# Patient Record
Sex: MALE | Race: WHITE | NOT HISPANIC OR LATINO | Employment: FULL TIME | URBAN - NONMETROPOLITAN AREA
[De-identification: names, ages, dates, MRNs, and addresses within clinical notes are randomized per-mention and may not be internally consistent; named-entity substitution may affect disease eponyms.]

---

## 2022-11-19 ENCOUNTER — OFFICE VISIT (OUTPATIENT)
Dept: FAMILY MEDICINE | Facility: CLINIC | Age: 22
End: 2022-11-19
Payer: COMMERCIAL

## 2022-11-19 VITALS
BODY MASS INDEX: 41.16 KG/M2 | RESPIRATION RATE: 18 BRPM | SYSTOLIC BLOOD PRESSURE: 136 MMHG | TEMPERATURE: 99 F | OXYGEN SATURATION: 98 % | HEIGHT: 71 IN | WEIGHT: 294 LBS | HEART RATE: 98 BPM | DIASTOLIC BLOOD PRESSURE: 94 MMHG

## 2022-11-19 DIAGNOSIS — J02.9 SORE THROAT: ICD-10-CM

## 2022-11-19 DIAGNOSIS — J01.00 ACUTE NON-RECURRENT MAXILLARY SINUSITIS: Primary | ICD-10-CM

## 2022-11-19 DIAGNOSIS — R05.8 COUGH WITH EXPOSURE TO COVID-19 VIRUS: ICD-10-CM

## 2022-11-19 DIAGNOSIS — I10 PRIMARY HYPERTENSION: Chronic | ICD-10-CM

## 2022-11-19 DIAGNOSIS — Z20.822 COUGH WITH EXPOSURE TO COVID-19 VIRUS: ICD-10-CM

## 2022-11-19 LAB
CTP QC/QA: YES
CTP QC/QA: YES
FLUAV AG NPH QL: NEGATIVE
FLUBV AG NPH QL: NEGATIVE
S PYO RRNA THROAT QL PROBE: NEGATIVE
SARS-COV-2 AG RESP QL IA.RAPID: NEGATIVE

## 2022-11-19 PROCEDURE — 87880 STREP A ASSAY W/OPTIC: CPT | Mod: QW,,, | Performed by: NURSE PRACTITIONER

## 2022-11-19 PROCEDURE — 3075F SYST BP GE 130 - 139MM HG: CPT | Mod: ,,, | Performed by: NURSE PRACTITIONER

## 2022-11-19 PROCEDURE — 96372 THER/PROPH/DIAG INJ SC/IM: CPT | Mod: ,,, | Performed by: NURSE PRACTITIONER

## 2022-11-19 PROCEDURE — 99051 PR MEDICAL SERVICES, EVE/WKEND/HOLIDAY: ICD-10-PCS | Mod: ,,, | Performed by: NURSE PRACTITIONER

## 2022-11-19 PROCEDURE — 3080F DIAST BP >= 90 MM HG: CPT | Mod: ,,, | Performed by: NURSE PRACTITIONER

## 2022-11-19 PROCEDURE — 1159F PR MEDICATION LIST DOCUMENTED IN MEDICAL RECORD: ICD-10-PCS | Mod: ,,, | Performed by: NURSE PRACTITIONER

## 2022-11-19 PROCEDURE — 4010F PR ACE/ARB THEARPY RXD/TAKEN: ICD-10-PCS | Mod: ,,, | Performed by: NURSE PRACTITIONER

## 2022-11-19 PROCEDURE — 4010F ACE/ARB THERAPY RXD/TAKEN: CPT | Mod: ,,, | Performed by: NURSE PRACTITIONER

## 2022-11-19 PROCEDURE — 87428 POCT SARS-COV2 (COVID) WITH FLU ANTIGEN: ICD-10-PCS | Mod: QW,,, | Performed by: NURSE PRACTITIONER

## 2022-11-19 PROCEDURE — 99214 PR OFFICE/OUTPT VISIT, EST, LEVL IV, 30-39 MIN: ICD-10-PCS | Mod: 25,,, | Performed by: NURSE PRACTITIONER

## 2022-11-19 PROCEDURE — 87428 SARSCOV & INF VIR A&B AG IA: CPT | Mod: QW,,, | Performed by: NURSE PRACTITIONER

## 2022-11-19 PROCEDURE — 99051 MED SERV EVE/WKEND/HOLIDAY: CPT | Mod: ,,, | Performed by: NURSE PRACTITIONER

## 2022-11-19 PROCEDURE — 3008F PR BODY MASS INDEX (BMI) DOCUMENTED: ICD-10-PCS | Mod: ,,, | Performed by: NURSE PRACTITIONER

## 2022-11-19 PROCEDURE — 1160F RVW MEDS BY RX/DR IN RCRD: CPT | Mod: ,,, | Performed by: NURSE PRACTITIONER

## 2022-11-19 PROCEDURE — 3075F PR MOST RECENT SYSTOLIC BLOOD PRESS GE 130-139MM HG: ICD-10-PCS | Mod: ,,, | Performed by: NURSE PRACTITIONER

## 2022-11-19 PROCEDURE — 3008F BODY MASS INDEX DOCD: CPT | Mod: ,,, | Performed by: NURSE PRACTITIONER

## 2022-11-19 PROCEDURE — 1160F PR REVIEW ALL MEDS BY PRESCRIBER/CLIN PHARMACIST DOCUMENTED: ICD-10-PCS | Mod: ,,, | Performed by: NURSE PRACTITIONER

## 2022-11-19 PROCEDURE — 96372 PR INJECTION,THERAP/PROPH/DIAG2ST, IM OR SUBCUT: ICD-10-PCS | Mod: ,,, | Performed by: NURSE PRACTITIONER

## 2022-11-19 PROCEDURE — 99214 OFFICE O/P EST MOD 30 MIN: CPT | Mod: 25,,, | Performed by: NURSE PRACTITIONER

## 2022-11-19 PROCEDURE — 3080F PR MOST RECENT DIASTOLIC BLOOD PRESSURE >= 90 MM HG: ICD-10-PCS | Mod: ,,, | Performed by: NURSE PRACTITIONER

## 2022-11-19 PROCEDURE — 1159F MED LIST DOCD IN RCRD: CPT | Mod: ,,, | Performed by: NURSE PRACTITIONER

## 2022-11-19 PROCEDURE — 87880 POCT RAPID STREP A: ICD-10-PCS | Mod: QW,,, | Performed by: NURSE PRACTITIONER

## 2022-11-19 RX ORDER — CEFTRIAXONE 1 G/1
1 INJECTION, POWDER, FOR SOLUTION INTRAMUSCULAR; INTRAVENOUS
Status: COMPLETED | OUTPATIENT
Start: 2022-11-19 | End: 2022-11-19

## 2022-11-19 RX ORDER — FLUTICASONE PROPIONATE 50 MCG
2 SPRAY, SUSPENSION (ML) NASAL DAILY
Qty: 16 G | Refills: 0 | Status: SHIPPED | OUTPATIENT
Start: 2022-11-19

## 2022-11-19 RX ORDER — CEFUROXIME AXETIL 500 MG/1
500 TABLET ORAL EVERY 12 HOURS
Qty: 14 TABLET | Refills: 0 | Status: SHIPPED | OUTPATIENT
Start: 2022-11-19

## 2022-11-19 RX ORDER — LISINOPRIL 20 MG/1
20 TABLET ORAL DAILY
COMMUNITY
Start: 2022-06-06

## 2022-11-19 RX ORDER — FLUOXETINE HYDROCHLORIDE 40 MG/1
40 CAPSULE ORAL EVERY MORNING
COMMUNITY
Start: 2022-08-02

## 2022-11-19 RX ORDER — BETAMETHASONE SODIUM PHOSPHATE AND BETAMETHASONE ACETATE 3; 3 MG/ML; MG/ML
6 INJECTION, SUSPENSION INTRA-ARTICULAR; INTRALESIONAL; INTRAMUSCULAR; SOFT TISSUE
Status: COMPLETED | OUTPATIENT
Start: 2022-11-19 | End: 2022-11-19

## 2022-11-19 RX ADMIN — BETAMETHASONE SODIUM PHOSPHATE AND BETAMETHASONE ACETATE 6 MG: 3; 3 INJECTION, SUSPENSION INTRA-ARTICULAR; INTRALESIONAL; INTRAMUSCULAR; SOFT TISSUE at 10:11

## 2022-11-19 RX ADMIN — CEFTRIAXONE 1 G: 1 INJECTION, POWDER, FOR SOLUTION INTRAMUSCULAR; INTRAVENOUS at 10:11

## 2022-11-19 NOTE — PROGRESS NOTES
"   Rebecca Little DNP, FNP    [address] [place] [address]     PATIENT NAME: Lester Bradley  : 2000  DATE: 22  MRN: [id number]      Billing Provider: Rebecca Little DNP, FNP  Level of Service:   Patient PCP Information       Provider PCP Type    Primary Doctor No General            Reason for Visit / Chief Complaint: Sinusitis (New patient to this clinic. Complains of one week of sinus drainage, sinus congestion, intermittent productive cough, headache, red sore throat and red itchy left eye. In [employer]. ), Sore Throat, Cough, Conjunctivitis (Left red eye with drainage. ), and Hypertension (On Lisinopril since Feb of this year. Stated he takes it every day. )       Update PCP  Update Chief Complaint         History of Present Illness / Problem Focused Workflow     Lester Bradley presents to the clinic with Sinusitis (New patient to this clinic. Complains of one week of sinus drainage, sinus congestion, intermittent productive cough, headache, red sore throat and red itchy left eye. In [employer]. ), Sore Throat, Cough, Conjunctivitis (Left red eye with drainage. ), and Hypertension (On Lisinopril since Feb of this year. Stated he takes it every day. )     Pt c/o "lime green" nasal drainage and eye discharge.     Has been taking otc meds without any relief.     Sinusitis  Associated symptoms include congestion, coughing and a sore throat. Pertinent negatives include no ear pain, shortness of breath or sinus pressure.   Sore Throat   Associated symptoms include congestion and coughing. Pertinent negatives include no abdominal pain, ear pain or shortness of breath.   Cough  Associated symptoms include a sore throat. Pertinent negatives include no chest pain, ear pain, rash or shortness of breath.   Conjunctivitis  Associated symptoms include congestion, coughing and a sore throat. Pertinent negatives include no abdominal pain, " arthralgias, chest pain, numbness, rash or weakness.   Hypertension  Pertinent negatives include no chest pain, palpitations or shortness of breath.     Review of Systems     Review of Systems   Constitutional:  Negative for activity change and appetite change.   HENT:  Positive for nasal congestion and sore throat. Negative for dental problem, ear pain and sinus pressure/congestion.    Eyes:  Positive for discharge.   Respiratory:  Positive for cough. Negative for chest tightness and shortness of breath.    Cardiovascular:  Negative for chest pain and palpitations.   Gastrointestinal:  Negative for abdominal pain.   Genitourinary:  Negative for bladder incontinence and dysuria.   Musculoskeletal:  Negative for arthralgias.   Integumentary:  Negative for rash.   Neurological:  Negative for dizziness, weakness and numbness.      Medical / Social / Family History     Past Medical History:   Diagnosis Date    Allergy     Anxiety     Depression     Hypertension        History reviewed. No pertinent surgical history.    Social History  Mr. Lester Bradley  reports that he has never smoked. He has never been exposed to tobacco smoke. He has never used smokeless tobacco. He reports that he does not use drugs.    Family History  Mr. Lester Bradley's family history is not on file.    Medications and Allergies     Medications  Outpatient Medications Marked as Taking for the 11/19/22 encounter (Office Visit) with Rebecca Little DNP, FNP   Medication Sig Dispense Refill    FLUoxetine 40 MG capsule Take 40 mg by mouth every morning.      lisinopriL (PRINIVIL,ZESTRIL) 20 MG tablet Take 20 mg by mouth once daily.       Current Facility-Administered Medications for the 11/19/22 encounter (Office Visit) with Rebecca Little DNP, FNP   Medication Dose Route Frequency Provider Last Rate Last Admin    [COMPLETED] betamethasone acetate-betamethasone sodium phosphate injection 6 mg  6 mg Intramuscular 1 time in Clinic/HOD  "Rebecca DEAN AQUILES Little, FNP   6 mg at 11/19/22 1010    [COMPLETED] cefTRIAXone injection 1 g  1 g Intramuscular 1 time in Clinic/HOD Rebecca Little AQUILES, FNP   1 g at 11/19/22 1011       Allergies  Review of patient's allergies indicates:   Allergen Reactions    Azithromycin     Penicillins Hives     As a child but just recently too augmentin & did not have a problem.      Povidone-iodine     Soap        Physical Examination     Vitals:    11/19/22 0909 11/19/22 0914 11/19/22 1001   BP: (!) 139/96 (!) 132/92 (!) 136/94   BP Location: Right arm Right arm Left arm   Patient Position: Sitting Sitting Sitting   BP Method: Large (Automatic) Large (Automatic) Large (Manual)   Pulse: 98     Resp: 18     Temp: 98.5 °F (36.9 °C)     TempSrc: Oral     SpO2: 98%     Weight: 133.4 kg (294 lb)     Height: 5' 11" (1.803 m)       Physical Exam  Vitals and nursing note reviewed.   Constitutional:       General: He is not in acute distress.     Appearance: He is obese.   HENT:      Nose: Congestion and rhinorrhea present.      Mouth/Throat:      Mouth: Mucous membranes are moist.   Eyes:      Pupils: Pupils are equal, round, and reactive to light.   Cardiovascular:      Rate and Rhythm: Normal rate and regular rhythm.      Pulses: Normal pulses.      Heart sounds: No murmur heard.  Pulmonary:      Effort: Pulmonary effort is normal. No respiratory distress.      Breath sounds: Normal breath sounds. No wheezing, rhonchi or rales.   Chest:      Chest wall: No tenderness.   Abdominal:      General: Bowel sounds are normal. There is no distension.      Palpations: Abdomen is soft.      Tenderness: There is no abdominal tenderness. There is no right CVA tenderness, left CVA tenderness, guarding or rebound.   Musculoskeletal:         General: No swelling or tenderness. Normal range of motion.      Cervical back: Normal range of motion and neck supple.      Right lower leg: No edema.      Left lower leg: No edema.   Skin:     General: Skin is " warm and dry.   Neurological:      General: No focal deficit present.      Mental Status: He is alert and oriented to person, place, and time.   Psychiatric:         Mood and Affect: Mood normal.         Behavior: Behavior normal.         Thought Content: Thought content normal.         Judgment: Judgment normal.        Assessment and Plan (including Health Maintenance)      Problem List  Smart Sets  Document Outside HM   :    Plan:         Health Maintenance Due   Topic Date Due    COVID-19 Vaccine (1) Never done       Problem List Items Addressed This Visit          Cardiac/Vascular    Hypertension (Chronic)     Other Visit Diagnoses       Acute non-recurrent maxillary sinusitis    -  Primary    Relevant Medications    cefTRIAXone injection 1 g (Completed)    betamethasone acetate-betamethasone sodium phosphate injection 6 mg (Completed)    cefUROXime (CEFTIN) 500 MG tablet    fluticasone propionate (FLONASE) 50 mcg/actuation nasal spray    chlorpheniramine-phenylephrine 4-10 mg per tablet    Cough with exposure to COVID-19 virus        Relevant Orders    POCT SARS-COV2 (COVID) with Flu Antigen (Completed)    Sore throat        Relevant Orders    POCT rapid strep A (Completed)          Acute non-recurrent maxillary sinusitis  -     cefTRIAXone injection 1 g  -     betamethasone acetate-betamethasone sodium phosphate injection 6 mg  -     cefUROXime (CEFTIN) 500 MG tablet; Take 1 tablet (500 mg total) by mouth every 12 (twelve) hours.  Dispense: 14 tablet; Refill: 0  -     fluticasone propionate (FLONASE) 50 mcg/actuation nasal spray; 2 sprays (100 mcg total) by Each Nostril route once daily.  Dispense: 16 g; Refill: 0  -     chlorpheniramine-phenylephrine 4-10 mg per tablet; Take 1 tablet by mouth 3 (three) times daily as needed for Congestion.  Dispense: 30 tablet; Refill: 0    Cough with exposure to COVID-19 virus  -     POCT SARS-COV2 (COVID) with Flu Antigen    Sore throat  -     POCT rapid strep A    Primary  hypertension     The patient has no Health Maintenance topics of status Not Due        No future appointments.     Follow up if symptoms worsen or fail to improve.     Signature:  Rebecca Little DNP, FNP  [address] [place] [address]  Phone #: [phone]  Fax #: [phone]    Date of encounter: 11/19/22    Patient Instructions   Avoid otc sinus meds. Monitor blood pressure. Follow up with primary care provider if bp remains > 140/90. Increase fluids. Take meds as prescribed. Follow up if symptoms persist or worsen.

## 2022-11-19 NOTE — PATIENT INSTRUCTIONS
Avoid otc sinus meds. Monitor blood pressure. Follow up with primary care provider if bp remains > 140/90. Increase fluids. Take meds as prescribed. Follow up if symptoms persist or worsen.

## 2022-11-19 NOTE — LETTER
November 19, 2022      [facility]  [address] [place] [address]  Phone: [phone]  Fax: [phone]       Patient: Lester Bradley   YOB: 2000  Date of Visit: 11/19/2022    To Whom It May Concern:    Dania Bradley  was at  on 11/19/2022. The patient may return to work/school on 11/21/22 with no restrictions. If you have any questions or concerns, or if I can be of further assistance, please do not hesitate to contact me.    Sincerely,    Rebecca Little, AQUILES, FNP

## 2024-01-05 ENCOUNTER — HOSPITAL ENCOUNTER (OUTPATIENT)
Dept: RADIOLOGY | Facility: HOSPITAL | Age: 24
Discharge: HOME | End: 2024-01-05
Attending: INTERNAL MEDICINE
Payer: COMMERCIAL

## 2024-01-05 DIAGNOSIS — R94.5 NONSPECIFIC ABNORMAL RESULTS OF LIVER FUNCTION STUDY: ICD-10-CM

## 2024-01-05 PROCEDURE — 76705 ECHO EXAM OF ABDOMEN: CPT | Mod: TC

## 2024-08-16 ENCOUNTER — OFFICE VISIT (OUTPATIENT)
Dept: FAMILY MEDICINE | Facility: CLINIC | Age: 24
End: 2024-08-16
Payer: COMMERCIAL

## 2024-08-16 VITALS
HEART RATE: 101 BPM | OXYGEN SATURATION: 97 % | DIASTOLIC BLOOD PRESSURE: 85 MMHG | RESPIRATION RATE: 20 BRPM | BODY MASS INDEX: 41.19 KG/M2 | TEMPERATURE: 98 F | WEIGHT: 294.19 LBS | HEIGHT: 71 IN | SYSTOLIC BLOOD PRESSURE: 130 MMHG

## 2024-08-16 DIAGNOSIS — I10 PRIMARY HYPERTENSION: Primary | ICD-10-CM

## 2024-08-16 DIAGNOSIS — K76.9 LESION OF LIVER: ICD-10-CM

## 2024-08-16 DIAGNOSIS — G43.E11 INTRACTABLE CHRONIC MIGRAINE WITH AURA WITH STATUS MIGRAINOSUS: ICD-10-CM

## 2024-08-16 DIAGNOSIS — E78.2 ELEVATED CHOLESTEROL WITH ELEVATED TRIGLYCERIDES: ICD-10-CM

## 2024-08-16 LAB
ALBUMIN SERPL BCP-MCNC: 4.8 G/DL (ref 3.5–5)
ALBUMIN/GLOB SERPL: 1.7 {RATIO}
ALP SERPL-CCNC: 54 U/L (ref 45–115)
ALT SERPL W P-5'-P-CCNC: 199 U/L (ref 16–61)
ANION GAP SERPL CALCULATED.3IONS-SCNC: 9 MMOL/L (ref 7–16)
AST SERPL W P-5'-P-CCNC: 73 U/L (ref 15–37)
BASOPHILS # BLD AUTO: 0.04 K/UL (ref 0–0.2)
BASOPHILS NFR BLD AUTO: 0.7 % (ref 0–1)
BILIRUB SERPL-MCNC: 0.9 MG/DL (ref ?–1.2)
BUN SERPL-MCNC: 15 MG/DL (ref 7–18)
BUN/CREAT SERPL: 16 (ref 6–20)
CALCIUM SERPL-MCNC: 10 MG/DL (ref 8.5–10.1)
CHLORIDE SERPL-SCNC: 102 MMOL/L (ref 98–107)
CHOLEST SERPL-MCNC: 211 MG/DL (ref 0–200)
CHOLEST/HDLC SERPL: 5.9 {RATIO}
CO2 SERPL-SCNC: 30 MMOL/L (ref 21–32)
CREAT SERPL-MCNC: 0.92 MG/DL (ref 0.7–1.3)
DIFFERENTIAL METHOD BLD: ABNORMAL
EGFR (NO RACE VARIABLE) (RUSH/TITUS): 121 ML/MIN/1.73M2
EOSINOPHIL # BLD AUTO: 0.1 K/UL (ref 0–0.5)
EOSINOPHIL NFR BLD AUTO: 1.8 % (ref 1–4)
ERYTHROCYTE [DISTWIDTH] IN BLOOD BY AUTOMATED COUNT: 12.6 % (ref 11.5–14.5)
GLOBULIN SER-MCNC: 2.9 G/DL (ref 2–4)
GLUCOSE SERPL-MCNC: 105 MG/DL (ref 74–106)
HCT VFR BLD AUTO: 50 % (ref 40–54)
HDLC SERPL-MCNC: 36 MG/DL (ref 40–60)
HGB BLD-MCNC: 16.7 G/DL (ref 13.5–18)
IMM GRANULOCYTES # BLD AUTO: 0.02 K/UL (ref 0–0.04)
IMM GRANULOCYTES NFR BLD: 0.4 % (ref 0–0.4)
LDLC SERPL CALC-MCNC: 117 MG/DL
LDLC/HDLC SERPL: 3.3 {RATIO}
LYMPHOCYTES # BLD AUTO: 2.59 K/UL (ref 1–4.8)
LYMPHOCYTES NFR BLD AUTO: 46.1 % (ref 27–41)
MCH RBC QN AUTO: 28 PG (ref 27–31)
MCHC RBC AUTO-ENTMCNC: 33.4 G/DL (ref 32–36)
MCV RBC AUTO: 83.9 FL (ref 80–96)
MONOCYTES # BLD AUTO: 0.6 K/UL (ref 0–0.8)
MONOCYTES NFR BLD AUTO: 10.7 % (ref 2–6)
MPC BLD CALC-MCNC: 9.5 FL (ref 9.4–12.4)
NEUTROPHILS # BLD AUTO: 2.27 K/UL (ref 1.8–7.7)
NEUTROPHILS NFR BLD AUTO: 40.3 % (ref 53–65)
NONHDLC SERPL-MCNC: 175 MG/DL
NRBC # BLD AUTO: 0 X10E3/UL
NRBC, AUTO (.00): 0 %
PLATELET # BLD AUTO: 285 K/UL (ref 150–400)
POTASSIUM SERPL-SCNC: 4.3 MMOL/L (ref 3.5–5.1)
PROT SERPL-MCNC: 7.7 G/DL (ref 6.4–8.2)
RBC # BLD AUTO: 5.96 M/UL (ref 4.6–6.2)
SODIUM SERPL-SCNC: 137 MMOL/L (ref 136–145)
TRIGL SERPL-MCNC: 289 MG/DL (ref 35–150)
VLDLC SERPL-MCNC: 58 MG/DL
WBC # BLD AUTO: 5.62 K/UL (ref 4.5–11)

## 2024-08-16 PROCEDURE — 80061 LIPID PANEL: CPT | Mod: ,,, | Performed by: CLINICAL MEDICAL LABORATORY

## 2024-08-16 PROCEDURE — 80053 COMPREHEN METABOLIC PANEL: CPT | Mod: ,,, | Performed by: CLINICAL MEDICAL LABORATORY

## 2024-08-16 PROCEDURE — 85025 COMPLETE CBC W/AUTO DIFF WBC: CPT | Mod: ,,, | Performed by: CLINICAL MEDICAL LABORATORY

## 2024-08-16 RX ORDER — DEXTROAMPHETAMINE SULFATE, DEXTROAMPHETAMINE SACCHARATE, AMPHETAMINE SULFATE AND AMPHETAMINE ASPARTATE 5; 5; 5; 5 MG/1; MG/1; MG/1; MG/1
CAPSULE, EXTENDED RELEASE ORAL
Status: CANCELLED | OUTPATIENT
Start: 2024-08-16

## 2024-08-16 RX ORDER — DEXTROAMPHETAMINE SULFATE, DEXTROAMPHETAMINE SACCHARATE, AMPHETAMINE SULFATE AND AMPHETAMINE ASPARTATE 5; 5; 5; 5 MG/1; MG/1; MG/1; MG/1
CAPSULE, EXTENDED RELEASE ORAL
COMMUNITY

## 2024-08-16 RX ORDER — ATOGEPANT 60 MG/1
60 TABLET ORAL DAILY
Qty: 90 TABLET | Refills: 3 | Status: SHIPPED | OUTPATIENT
Start: 2024-08-16

## 2024-08-16 RX ORDER — UBROGEPANT 100 MG/1
100 TABLET ORAL
Qty: 30 TABLET | Refills: 1 | Status: SHIPPED | OUTPATIENT
Start: 2024-08-16

## 2024-08-16 RX ORDER — LISINOPRIL AND HYDROCHLOROTHIAZIDE 12.5; 2 MG/1; MG/1
1 TABLET ORAL
COMMUNITY
Start: 2024-07-31

## 2024-08-16 RX ORDER — SERTRALINE HYDROCHLORIDE 50 MG/1
50 TABLET, FILM COATED ORAL
Status: CANCELLED | OUTPATIENT
Start: 2024-08-16

## 2024-08-16 RX ORDER — SERTRALINE HYDROCHLORIDE 50 MG/1
50 TABLET, FILM COATED ORAL
COMMUNITY
Start: 2024-07-29

## 2024-08-16 RX ORDER — ROSUVASTATIN CALCIUM 5 MG/1
5 TABLET, COATED ORAL
COMMUNITY
Start: 2024-07-31

## 2024-08-16 NOTE — PROGRESS NOTES
SHIVAM Cordero   [facility]  [address]  [place] [address]     PATIENT NAME: Lester Bradley  : 2000  DATE: 24  MRN: [id number]      Billing Provider: SHIVAM Cordero  Level of Service:   Patient PCP Information       Provider PCP Type    Primary Doctor No General            Reason for Visit / Chief Complaint: Establish Care (Med refills was seeing Bishop Mcknight and he was prescribed cholesterol medications and hctz and his wife wants him to have labs and all done to make sure he needs to be on all that medication )   Health Maintenance Due   Topic Date Due    Hepatitis C Screening  Never done    Lipid Panel  Never done    HPV Vaccines (1 - Male 3-dose series) Never done    TETANUS VACCINE  Never done    COVID-19 Vaccine ( season) Never done          Update PCP  Update Chief Complaint         History of Present Illness / Problem Focused Workflow     Lester Bradley presents to the clinic to establish care. He states he has been put on bp and cholesterol med. He states he has been on bp med before, but not cholesterol. He states he just wants everything rechecked because he was not fasting and wants to make sure he needs to take both meds. He states he has had migraines since he was young. He states he has been taking excedrin that helped in the past but recently has been worse. He states he has also taken rx meds for migraines as needed but unsure of name. He states he will have them sometimes 2-3 times per week. He he states he has had numbness to the right side of this face with this. He does have aura with small headache and can just tell he Is about to have one. He states they do generally make it to where he can't function as he will have to go lie down in dark, quiet room. He states dad had migraines growing up. He states his mom also had real bad headaches when she was younger but never went to the dr. He states he does have enlarged lymph node to  the right side of head but has been checked by [facility] at [facility] and everything checked out good. He states he does have elevated liver enzymes and had liver us done that did show a lesion on the right lobes of his liver. It was recommended to have ct scan or mri with and without contrast, but he states he has not had this done. He denies any symptoms from this. He does agree with order to be placed today for these. Provider has called [facility] and pt has had sumatriptans in the past filled that have not helped his migraines. He states he has a friend that has gave him ubrelvy that has helped so he is requesting this today if possible. 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 Pt is followed by GRISEL Brandon NP at [facility] where he is prescribed Adderall and Zoloft. He denies any other needs at this time. NAD noted.       Wt Readings from Last 3 Encounters:   08/16/24 0932 133.4 kg (294 lb 3.2 oz)   11/19/22 0909 133.4 kg (294 lb)        BP Readings from Last 3 Encounters:   08/16/24 130/85   11/19/22 (!) 136/94        Review of Systems     Review of Systems   Constitutional: Negative.    HENT: Negative.     Eyes: Negative.    Respiratory: Negative.     Cardiovascular: Negative.    Gastrointestinal: Negative.    Endocrine: Negative.    Genitourinary: Negative.    Musculoskeletal: Negative.    Integumentary:         Small, enlarged lymph node to back, right, lower portion of head   Allergic/Immunologic: Negative.    Neurological: Negative.    Hematological: Negative.    Psychiatric/Behavioral: Negative.          Medical / Social / Family History     Past Medical History:   Diagnosis Date    Allergy     Anxiety     Depression     Hypertension        Past Surgical History:   Procedure Laterality Date    TONSILLECTOMY         Social History    reports that he has never smoked. He has never  "been exposed to tobacco smoke. He has never used smokeless tobacco. He reports current alcohol use. He reports that he does not use drugs.    Family History  's family history includes Arthritis in his mother; Cancer in his father, paternal grandmother, and paternal uncle; Diabetes in his mother; Hypertension in his father and mother.    Medications and Allergies     Medications  Outpatient Medications Marked as Taking for the 8/16/24 encounter (Office Visit) with Sade Rojo FNP   Medication Sig Dispense Refill    ADDERALL XR 20 mg 24 hr capsule       lisinopriL-hydrochlorothiazide (PRINZIDE,ZESTORETIC) 20-12.5 mg per tablet Take 1 tablet by mouth.      sertraline (ZOLOFT) 50 MG tablet Take 50 mg by mouth.         Allergies  Review of patient's allergies indicates:   Allergen Reactions    Penicillins Hives and Rash     As a child but just recently too augmentin & did not have a problem.    Povidone-iodine Rash    Azithromycin        Physical Examination     Vitals:    08/16/24 0932   BP: 130/85   BP Location: Left arm   Patient Position: Sitting   BP Method: Large (Automatic)   Pulse: 101   Resp: 20   Temp: 98.1 °F (36.7 °C)   TempSrc: Oral   SpO2: 97%   Weight: 133.4 kg (294 lb 3.2 oz)   Height: 5' 11" (1.803 m)      Physical Exam  Constitutional:       Appearance: Normal appearance. He is obese.   HENT:      Head: Normocephalic.   Eyes:      Extraocular Movements: Extraocular movements intact.   Cardiovascular:      Rate and Rhythm: Normal rate and regular rhythm.      Pulses: Normal pulses.      Heart sounds: Normal heart sounds.   Pulmonary:      Effort: Pulmonary effort is normal.      Breath sounds: Normal breath sounds.   Lymphadenopathy:      Head:      Right side of head: Occipital adenopathy present.   Skin:     General: Skin is warm and dry.      Capillary Refill: Capillary refill takes less than 2 seconds.   Neurological:      General: No focal deficit present.      Mental Status: He is alert and " oriented to person, place, and time.   Psychiatric:         Mood and Affect: Mood normal.         Behavior: Behavior normal.          Assessment and Plan (including Health Maintenance)      Problem List  Smart Sets  Document Outside HM   :    Plan:     There are no Patient Instructions on file for this visit.       Health Maintenance Due   Topic Date Due    Hepatitis C Screening  Never done    Lipid Panel  Never done    HPV Vaccines (1 - Male 3-dose series) Never done    TETANUS VACCINE  Never done    COVID-19 Vaccine (1 - 2023-24 season) Never done       Problem List Items Addressed This Visit       Hypertension - Primary (Chronic)    Current Assessment & Plan     Labs pending. Will inform of results when available.   Blood pressure is controlled. Current medical regimen is effective;   Will adjust according to results and monitor.     1) Check your blood pressure at home. Please notify me at the clinic if the systolic (top number) is consistently over 140 or under 110 or if the diastolic (bottom number) is consistently over 90 or under 60.  2) Regular aerobic physical activity ( e.g. brisk walking) at least 30 min 5 out of 7 days of the week  3) Low sodium diet.  4) Take your meds as directed  5) To the ER for any signs of chest pain/tightness/palpitations/shortness of breath/difficulty speaking/numbness or tingling in face or extremities  6) Have yearly eye exams           Relevant Orders    CBC Auto Differential    Comprehensive Metabolic Panel    Elevated cholesterol with elevated triglycerides    Current Assessment & Plan     Labs pending. Will inform of results when available.          Relevant Orders    Lipid Panel    Intractable chronic migraine with aura with status migrainosus    Current Assessment & Plan     Instructed will try to get daily preventive medication, Qulipta, and as needed medication, Ubrelvy, approved through insurance as these will require PA. Pt vu.   .         Relevant Medications     ubrogepant (UBRELVY) 100 mg tablet    atogepant (QULIPTA) 60 mg Tab    Lesion of liver    Current Assessment & Plan     Ct scan abd/pelvis w/wo contrast pending approval.          Relevant Orders    CT Abdomen Pelvis W Wo Contrast     Primary hypertension  -     CBC Auto Differential; Future; Expected date: 08/16/2024  -     Comprehensive Metabolic Panel; Future; Expected date: 08/16/2024    Elevated cholesterol with elevated triglycerides  -     Lipid Panel; Future; Expected date: 08/16/2024    Intractable chronic migraine with aura with status migrainosus  -     ubrogepant (UBRELVY) 100 mg tablet; Take 1 tablet (100 mg total) by mouth as needed for Migraine. If symptoms persist or return, may repeat dose after 2 hours. Maximum: 200 mg per 24 hours  Dispense: 30 tablet; Refill: 1  -     atogepant (QULIPTA) 60 mg Tab; Take 1 tablet (60 mg total) by mouth Daily.  Dispense: 90 tablet; Refill: 3    Lesion of liver  -     CT Abdomen Pelvis W Wo Contrast; Future; Expected date: 08/16/2024       Health Maintenance Topics with due status: Not Due       Topic Last Completion Date    Influenza Vaccine Not Due         Future Appointments   Date Time Provider Department Center   2/17/2025  8:00 AM Sade Rojo FNP [facility]        Follow up in about 6 months (around 2/16/2025), or if symptoms worsen or fail to improve.    Health Maintenance Due   Topic Date Due    Hepatitis C Screening  Never done    Lipid Panel  Never done    HPV Vaccines (1 - Male 3-dose series) Never done    TETANUS VACCINE  Never done    COVID-19 Vaccine (1 - 2023-24 season) Never done        Signature:  SHIVAM Cordero    Date of encounter: 8/16/24

## 2024-08-16 NOTE — ASSESSMENT & PLAN NOTE
Instructed will try to get daily preventive medication, Qulipta, and as needed medication, Ubrelvy, approved through insurance as these will require PA. Pt dominic.   .

## 2024-08-16 NOTE — Clinical Note
Aryan ca can you get records for him please from [facility]. Just last clinic note and labs will be good. Thanks!

## 2024-08-16 NOTE — ASSESSMENT & PLAN NOTE
Labs pending. Will inform of results when available.   Blood pressure is controlled. Current medical regimen is effective;   Will adjust according to results and monitor.     1) Check your blood pressure at home. Please notify me at the clinic if the systolic (top number) is consistently over 140 or under 110 or if the diastolic (bottom number) is consistently over 90 or under 60.  2) Regular aerobic physical activity ( e.g. brisk walking) at least 30 min 5 out of 7 days of the week  3) Low sodium diet.  4) Take your meds as directed  5) To the ER for any signs of chest pain/tightness/palpitations/shortness of breath/difficulty speaking/numbness or tingling in face or extremities  6) Have yearly eye exams

## 2024-08-16 NOTE — PROGRESS NOTES
Provider informed pt of elevated cholesterol levels and elevated liver enzymes which pt was aware of. We currently waiting on Ct scan of abd/pelvis to be approved to check on lesion of the liver. Instructed pt I have calculated his risk score for his cholesterol and at his age, medication is not recommended yet. However, lifestyle changes are, such as low cholesterol diet and exercising at least 30 minutes per day 5 days out of the week. He did mention he was going to try and start going back to the gym. Instructed we will recheck these levels when he comes back for 6 months appt fasting. Pt dominic.

## 2024-08-19 ENCOUNTER — PATIENT OUTREACH (OUTPATIENT)
Facility: HOSPITAL | Age: 24
End: 2024-08-19
Payer: COMMERCIAL

## 2024-08-19 NOTE — PROGRESS NOTES
Population Health Chart Review & Patient Outreach Details      Further Action Needed If Patient Returns Outreach:        24 CADE sent to [facility] [place] for recent note and labs TC,Lpn-CCC    Updates Requested / Reviewed:     [x]  Care Everywhere    [x]     []  External Sources ([facility], [facility], [facility], etc.)    [] [facility]   [] [facility]   [] Other:    [x]  Care Team Updated   []  Removed  or Duplicate Orders   []  Immunization Reconciliation Completed / Queried    [] [place]   [] [place]   [] [place]   [] [place]      Health Maintenance Topics Addressed and Outreach Outcomes / Actions Taken:             Breast Cancer Screening []  Mammogram Order Placed    []  Mammogram Screening Scheduled    []  External Records Requested & Care Team Updated if Applicable    []  External Records Uploaded & Care Team Updated if Applicable    []  Pt Declined Scheduling Mammogram    []  Pt Will Schedule with External Provider / Order Routed & Care Team Updated if Applicable              Cervical Cancer Screening []  Pap Smear Scheduled in Primary Care or OBGYN    []  External Records Requested & Care Team Updated if Applicable       []  External Records Uploaded, Care Team Updated, & History Updated if Applicable    []  Patient Declined Scheduling Pap Smear    []  Patient Will Schedule with External Provider & Care Team Updated if Applicable                  Colorectal Cancer Screening []  Colonoscopy Case Request / Referral / Home Test Order Placed    []  External Records Requested & Care Team Updated if Applicable    []  External Records Uploaded, Care Team Updated, & History Updated if Applicable    []  Patient Declined Completing Colon Cancer Screening    []  Patient Will Schedule with External Provider & Care Team Updated if Applicable    []  Fit Kit Mailed (add the SmartPhrase under additional notes)    []  Reminded Patient to Complete Home Test                Diabetic Eye Exam []  Eye Exam  Screening Order Placed    []  Eye Camera Scheduled or Optometry/Ophthalmology Referral Placed    []  External Records Requested & Care Team Updated if Applicable    []  External Records Uploaded, Care Team Updated, & History Updated if Applicable    []  Patient Declined Scheduling Eye Exam    []  Patient Will Schedule with External Provider & Care Team Updated if Applicable             Blood Pressure Control []  Primary Care Follow Up Visit Scheduled     []  Remote Blood Pressure Reading Captured    []  Patient Declined Remote Reading or Scheduling Appt - Escalated to PCP    []  Patient Will Call Back or Send Portal Message with Reading                 HbA1c & Other Labs []  Overdue Lab(s) Ordered    []  Overdue Lab(s) Scheduled    []  External Records Uploaded & Care Team Updated if Applicable    []  Primary Care Follow Up Visit Scheduled     []  Reminded Patient to Complete A1c Home Test    []  Patient Declined Scheduling Labs or Will Call Back to Schedule    []  Patient Will Schedule with External Provider / Order Routed, & Care Team Updated if Applicable           Primary Care Appointment []  Primary Care Appt Scheduled    []  Patient Declined Scheduling or Will Call Back to Schedule    []  Pt Established with External Provider, Updated Care Team, & Informed Pt to Notify Payor if Applicable           Medication Adherence /    Statin Use []  Primary Care Appointment Scheduled    []  Patient Reminded to  Prescription    []  Patient Declined, Provider Notified if Needed    []  Sent Provider Message to Review to Evaluate Pt for Statin, Add Exclusion Dx Codes, Document   Exclusion in Problem List, Change Statin Intensity Level to Moderate or High Intensity if Applicable                Osteoporosis Screening []  Dexa Order Placed    []  Dexa Appointment Scheduled    []  External Records Requested & Care Team Updated    []  External Records Uploaded, Care Team Updated, & History Updated if Applicable    []   Patient Declined Scheduling Dexa or Will Call Back to Schedule    []  Patient Will Schedule with External Provider / Order Routed & Care Team Updated if Applicable       Additional Notes:

## 2024-08-19 NOTE — LETTER
AUTHORIZATION FOR RELEASE OF   CONFIDENTIAL INFORMATION    Dear [facility],    We are seeing Lester Bradley, date of birth 2000, in the clinic at [facility]. Sade Rojo FNP is the patient's PCP. Lester Bradley has an outstanding lab/procedure at the time we reviewed his chart. In order to help keep his health information updated, he has authorized us to request the following medical record(s):                                     ( X )  OUTSIDE LAB RESULTS  (X) Most recent note labs test etc,        Please fax records to [facility], Cain, Sierra, FNP, [phone]     If you have any questions, please contact Buddy Kumar at [phone].           Patient Name: Lester Bradley  : 2000  Patient Phone #: [phone]     
Appropriate/Calm

## 2024-08-23 ENCOUNTER — HOSPITAL ENCOUNTER (OUTPATIENT)
Dept: RADIOLOGY | Facility: HOSPITAL | Age: 24
Discharge: HOME | End: 2024-08-23
Attending: NURSE PRACTITIONER
Payer: COMMERCIAL

## 2024-08-23 DIAGNOSIS — K76.9 LESION OF LIVER: ICD-10-CM

## 2024-08-23 PROCEDURE — 74178 CT ABD&PLV WO CNTR FLWD CNTR: CPT | Mod: TC

## 2024-08-23 PROCEDURE — 25500020 PHARM REV CODE 255: Performed by: NURSE PRACTITIONER

## 2024-08-23 RX ORDER — IOPAMIDOL 755 MG/ML
100 INJECTION, SOLUTION INTRAVASCULAR
Status: COMPLETED | OUTPATIENT
Start: 2024-08-23 | End: 2024-08-23

## 2024-08-23 RX ADMIN — IOPAMIDOL 100 ML: 755 INJECTION, SOLUTION INTRAVENOUS at 09:08

## 2024-08-23 NOTE — PROGRESS NOTES
Please let pt know his ct scan was normal. There was not even a lesion in the liver that was mentioned on ultrasound so everything looks good. Thanks!

## 2024-11-19 ENCOUNTER — PATIENT MESSAGE (OUTPATIENT)
Dept: FAMILY MEDICINE | Facility: CLINIC | Age: 24
End: 2024-11-19
Payer: COMMERCIAL

## 2024-11-19 ENCOUNTER — OFFICE VISIT (OUTPATIENT)
Dept: FAMILY MEDICINE | Facility: CLINIC | Age: 24
End: 2024-11-19
Payer: COMMERCIAL

## 2024-11-19 VITALS
SYSTOLIC BLOOD PRESSURE: 127 MMHG | RESPIRATION RATE: 20 BRPM | HEART RATE: 98 BPM | HEIGHT: 71 IN | BODY MASS INDEX: 41.86 KG/M2 | OXYGEN SATURATION: 97 % | WEIGHT: 299 LBS | TEMPERATURE: 99 F | DIASTOLIC BLOOD PRESSURE: 78 MMHG

## 2024-11-19 DIAGNOSIS — G89.29 CHRONIC PAIN OF LEFT KNEE: ICD-10-CM

## 2024-11-19 DIAGNOSIS — M25.562 CHRONIC PAIN OF LEFT KNEE: ICD-10-CM

## 2024-11-19 DIAGNOSIS — M25.552 LEFT HIP PAIN: Primary | ICD-10-CM

## 2024-11-19 DIAGNOSIS — M79.652 LEFT THIGH PAIN: ICD-10-CM

## 2024-11-19 PROCEDURE — 4010F ACE/ARB THERAPY RXD/TAKEN: CPT | Mod: CPTII,,, | Performed by: NURSE PRACTITIONER

## 2024-11-19 PROCEDURE — 3074F SYST BP LT 130 MM HG: CPT | Mod: CPTII,,, | Performed by: NURSE PRACTITIONER

## 2024-11-19 PROCEDURE — 99214 OFFICE O/P EST MOD 30 MIN: CPT | Mod: ,,, | Performed by: NURSE PRACTITIONER

## 2024-11-19 PROCEDURE — 3008F BODY MASS INDEX DOCD: CPT | Mod: CPTII,,, | Performed by: NURSE PRACTITIONER

## 2024-11-19 PROCEDURE — 1159F MED LIST DOCD IN RCRD: CPT | Mod: CPTII,,, | Performed by: NURSE PRACTITIONER

## 2024-11-19 PROCEDURE — 3078F DIAST BP <80 MM HG: CPT | Mod: CPTII,,, | Performed by: NURSE PRACTITIONER

## 2024-11-19 PROCEDURE — 1160F RVW MEDS BY RX/DR IN RCRD: CPT | Mod: CPTII,,, | Performed by: NURSE PRACTITIONER

## 2024-11-19 NOTE — PROGRESS NOTES
"   SHIVAM Cordero   [facility] [place]  [address]  [place] [address]     PATIENT NAME: Lester Bradley  : 2000  DATE: 24  MRN: [id number]      Billing Provider: SHIVAM Cordero  Level of Service:   Patient PCP Information       Provider PCP Type    SHIVAM Cordero General            Reason for Visit / Chief Complaint: Leg Pain (Pt states the back of his left thigh, has been hurting since Feb. The pain runs up from his thigh to his buttock. He has not seen anybody about it before, and has not had any xrays. He thinks he might have hurt when moving a patient. 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 He has been taken OTC meds. )   Health Maintenance Due   Topic Date Due    Hepatitis C Screening  Never done    Pneumococcal Vaccines (Age 0-64) (1 of 2 - PCV) Never done    HPV Vaccines (1 - Male 3-dose series) Never done    TETANUS VACCINE  Never done    COVID-19 Vaccine (1 - 2024-25 season) Never done          Update PCP  Update Chief Complaint         History of Present Illness / Problem Focused Workflow     Lester Bradley presents to the clinic due to left leg pain from his left buttock down his left calf at times. He states he has tried exercises, stretching with not much relief. He has not been to PT. 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 He denies hearing a "pop" but states his knee got caught between the wall and the stretcher. He denies actual knee pain, but does have pain to the back of his left knee at times and it feels "tight". He is unsure if he just pulled something or what but he has not had any swelling or bruising to area. He denies numbness, tingling to leg. He states the pain may start in his left hip and go down but is unsure exactly where the pain originated from. He states he will take otc pain relievers at times that help but it will just keep coming back. He states " "it has been affecting his work outs and just wanted to get it seen about. He denies any other need at this time. NAD noted.     No results found for: "HGBA1C"     CMP  Sodium   Date Value Ref Range Status   08/16/2024 137 136 - 145 mmol/L Final     Potassium   Date Value Ref Range Status   08/16/2024 4.3 3.5 - 5.1 mmol/L Final     Chloride   Date Value Ref Range Status   08/16/2024 102 98 - 107 mmol/L Final     CO2   Date Value Ref Range Status   08/16/2024 30 21 - 32 mmol/L Final     Glucose   Date Value Ref Range Status   08/16/2024 105 74 - 106 mg/dL Final     BUN   Date Value Ref Range Status   08/16/2024 15 7 - 18 mg/dL Final     Creatinine   Date Value Ref Range Status   08/16/2024 0.92 0.70 - 1.30 mg/dL Final     Calcium   Date Value Ref Range Status   08/16/2024 10.0 8.5 - 10.1 mg/dL Final     Total Protein   Date Value Ref Range Status   08/16/2024 7.7 6.4 - 8.2 g/dL Final     Albumin   Date Value Ref Range Status   08/16/2024 4.8 3.5 - 5.0 g/dL Final     Bilirubin, Total   Date Value Ref Range Status   08/16/2024 0.9 >0.0 - 1.2 mg/dL Final     Alk Phos   Date Value Ref Range Status   08/16/2024 54 45 - 115 U/L Final     AST   Date Value Ref Range Status   08/16/2024 73 (H) 15 - 37 U/L Final     ALT   Date Value Ref Range Status   08/16/2024 199 (H) 16 - 61 U/L Final     Anion Gap   Date Value Ref Range Status   08/16/2024 9 7 - 16 mmol/L Final     eGFR   Date Value Ref Range Status   08/16/2024 121 >=60 mL/min/1.73m2 Final        Lab Results   Component Value Date    WBC 5.62 08/16/2024    RBC 5.96 08/16/2024    HGB 16.7 08/16/2024    HCT 50.0 08/16/2024    MCV 83.9 08/16/2024    MCH 28.0 08/16/2024    MCHC 33.4 08/16/2024    RDW 12.6 08/16/2024     08/16/2024    MPV 9.5 08/16/2024    LYMPH 46.1 (H) 08/16/2024    LYMPH 2.59 08/16/2024    MONO 10.7 (H) 08/16/2024    EOS 0.10 08/16/2024    BASO 0.04 08/16/2024    EOSINOPHIL 1.8 08/16/2024    BASOPHIL 0.7 08/16/2024        Lab Results   Component Value " Date    CHOL 211 (H) 08/16/2024     Lab Results   Component Value Date    HDL 36 (L) 08/16/2024     Lab Results   Component Value Date    LDLCALC 117 08/16/2024     Lab Results   Component Value Date    TRIG 289 (H) 08/16/2024     Lab Results   Component Value Date    CHOLHDL 5.9 08/16/2024        Wt Readings from Last 3 Encounters:   11/19/24 1319 135.6 kg (299 lb)   08/16/24 0932 133.4 kg (294 lb 3.2 oz)   11/19/22 0909 133.4 kg (294 lb)        BP Readings from Last 3 Encounters:   11/19/24 127/78   08/16/24 130/85   11/19/22 (!) 136/94        Review of Systems     Review of Systems   Constitutional:  Negative for activity change and unexpected weight change.   HENT:  Negative for hearing loss, rhinorrhea and trouble swallowing.    Eyes:  Negative for discharge and visual disturbance.   Respiratory:  Negative for chest tightness and wheezing.    Cardiovascular:  Negative for chest pain and palpitations.   Gastrointestinal:  Negative for blood in stool, constipation, diarrhea and vomiting.   Endocrine: Negative for polydipsia and polyuria.   Genitourinary:  Negative for difficulty urinating, hematuria and urgency.   Musculoskeletal:  Positive for arthralgias, leg pain and myalgias. Negative for joint swelling and neck pain.   Neurological:  Negative for weakness.   Psychiatric/Behavioral:  Negative for confusion and dysphoric mood.         Medical / Social / Family History     Past Medical History:   Diagnosis Date    Allergy     Anxiety     Depression     Hypertension        Past Surgical History:   Procedure Laterality Date    TONSILLECTOMY         Social History    reports that he has never smoked. He has never been exposed to tobacco smoke. He has never used smokeless tobacco. He reports current alcohol use. He reports that he does not use drugs.    Family History  's family history includes Arthritis in his mother; Cancer in his father, paternal grandmother, and paternal uncle; Diabetes in his mother;  "Hypertension in his father and mother.    Medications and Allergies     Medications  Outpatient Medications Marked as Taking for the 11/19/24 encounter (Office Visit) with Sade Rojo FNP   Medication Sig Dispense Refill    ADDERALL XR 20 mg 24 hr capsule       lisinopriL-hydrochlorothiazide (PRINZIDE,ZESTORETIC) 20-12.5 mg per tablet Take 1 tablet by mouth.      sertraline (ZOLOFT) 50 MG tablet Take 50 mg by mouth.      ubrogepant (UBRELVY) 100 mg tablet Take 1 tablet (100 mg total) by mouth as needed for Migraine. If symptoms persist or return, may repeat dose after 2 hours. Maximum: 200 mg per 24 hours 30 tablet 1       Allergies  Review of patient's allergies indicates:   Allergen Reactions    Penicillins Hives and Rash     As a child but just recently too augmentin & did not have a problem.    Povidone-iodine Rash    Azithromycin        Physical Examination     Vitals:    11/19/24 1319   BP: 127/78   BP Location: Left arm   Patient Position: Sitting   Pulse: 98   Resp: 20   Temp: 98.5 °F (36.9 °C)   TempSrc: Oral   SpO2: 97%   Weight: 135.6 kg (299 lb)   Height: 5' 11" (1.803 m)      Physical Exam  Constitutional:       Appearance: Normal appearance. He is obese.   HENT:      Head: Normocephalic.   Eyes:      Extraocular Movements: Extraocular movements intact.   Cardiovascular:      Rate and Rhythm: Normal rate and regular rhythm.      Pulses: Normal pulses.      Heart sounds: Normal heart sounds.   Pulmonary:      Effort: Pulmonary effort is normal.      Breath sounds: Normal breath sounds.   Musculoskeletal:      Left hip: No deformity, lacerations, tenderness, bony tenderness or crepitus. Normal range of motion. Normal strength.      Left upper leg: No swelling, edema, deformity, lacerations, tenderness or bony tenderness.      Left knee: Normal.      Left lower leg: No swelling, deformity, lacerations, tenderness or bony tenderness. No edema.   Skin:     General: Skin is warm and dry.      Capillary " Refill: Capillary refill takes less than 2 seconds.   Neurological:      General: No focal deficit present.      Mental Status: He is alert and oriented to person, place, and time.   Psychiatric:         Mood and Affect: Mood normal.         Behavior: Behavior normal.          Assessment and Plan (including Health Maintenance)      Problem List  Smart Sets  Document Outside HM   :    Plan:     There are no Patient Instructions on file for this visit.       Health Maintenance Due   Topic Date Due    Hepatitis C Screening  Never done    Pneumococcal Vaccines (Age 0-64) (1 of 2 - PCV) Never done    HPV Vaccines (1 - Male 3-dose series) Never done    TETANUS VACCINE  Never done    COVID-19 Vaccine (1 - 2024-25 season) Never done       Problem List Items Addressed This Visit       Chronic pain of left knee    Current Assessment & Plan     Xrays of each area pending. Will inform of results when available.   May refer to PT or get MRI approved if warranted. Pt vu.  Instructed to RTC for any new/worsening/persisting ssx.        Instructed on RICE therapy: R-rest (extremity when able), I-ice (alternate 20 minutes at a time) C-compression (may use brace to extremity to help with pain/swelling), E-elevation (prop extremity on pillows to help with pain/swelling)         Relevant Orders    X-Ray Knee 1 or 2 View Left (Completed)    Left hip pain - Primary    Current Assessment & Plan     Xrays of each area pending. Will inform of results when available.   May refer to PT or get MRI approved if warranted. Pt vu.  Instructed to RTC for any new/worsening/persisting ssx.        Instructed on RICE therapy: R-rest (extremity when able), I-ice (alternate 20 minutes at a time) C-compression (may use brace to extremity to help with pain/swelling), E-elevation (prop extremity on pillows to help with pain/swelling)         Relevant Orders    X-Ray Hip 2 or 3 views Left with Pelvis when performed (Completed)    Left thigh pain    Current  Assessment & Plan     Xrays of each area pending. Will inform of results when available.   May refer to PT or get MRI approved if warranted. Pt vu.  Instructed to RTC for any new/worsening/persisting ssx.       Instructed on RICE therapy: R-rest (extremity when able), I-ice (alternate 20 minutes at a time) C-compression (may use brace to extremity to help with pain/swelling), E-elevation (prop extremity on pillows to help with pain/swelling)           Relevant Orders    X-Ray Femur 2 View Left (Completed)     Left hip pain  -     X-Ray Hip 2 or 3 views Left with Pelvis when performed; Future; Expected date: 11/19/2024    Left thigh pain  -     X-Ray Femur 2 View Left; Future; Expected date: 11/19/2024    Chronic pain of left knee  -     X-Ray Knee 1 or 2 View Left; Future; Expected date: 11/19/2024       Health Maintenance Topics with due status: Not Due       Topic Last Completion Date    RSV Vaccine (Age 60+ and Pregnant patients) Not Due         Future Appointments   Date Time Provider Department Center   2/17/2025  8:00 AM Sade Rojo FNP [facility]        No follow-ups on file.    Health Maintenance Due   Topic Date Due    Hepatitis C Screening  Never done    Pneumococcal Vaccines (Age 0-64) (1 of 2 - PCV) Never done    HPV Vaccines (1 - Male 3-dose series) Never done    TETANUS VACCINE  Never done    COVID-19 Vaccine (1 - 2024-25 season) Never done        Signature:  SHIVAM Cordero    Date of encounter: 11/19/24

## 2024-11-21 DIAGNOSIS — M79.652 LEFT THIGH PAIN: Primary | ICD-10-CM

## 2024-11-22 DIAGNOSIS — M79.652 LEFT THIGH PAIN: Primary | ICD-10-CM

## 2024-11-25 PROBLEM — M25.552 LEFT HIP PAIN: Status: ACTIVE | Noted: 2024-11-25

## 2024-11-25 PROBLEM — M25.562 CHRONIC PAIN OF LEFT KNEE: Status: ACTIVE | Noted: 2024-11-25

## 2024-11-25 PROBLEM — M79.652 LEFT THIGH PAIN: Status: ACTIVE | Noted: 2024-11-25

## 2024-11-25 PROBLEM — G89.29 CHRONIC PAIN OF LEFT KNEE: Status: ACTIVE | Noted: 2024-11-25

## 2024-11-25 NOTE — ASSESSMENT & PLAN NOTE
Xrays of each area pending. Will inform of results when available.   May refer to PT or get MRI approved if warranted. Pt vu.  Instructed to RTC for any new/worsening/persisting ssx.       Instructed on RICE therapy: R-rest (extremity when able), I-ice (alternate 20 minutes at a time) C-compression (may use brace to extremity to help with pain/swelling), E-elevation (prop extremity on pillows to help with pain/swelling)

## 2025-01-03 ENCOUNTER — TELEPHONE (OUTPATIENT)
Dept: FAMILY MEDICINE | Facility: CLINIC | Age: 25
End: 2025-01-03
Payer: COMMERCIAL

## 2025-01-03 DIAGNOSIS — M25.552 PAIN OF LEFT HIP: Primary | ICD-10-CM

## 2025-01-03 NOTE — TELEPHONE ENCOUNTER
Patient called and said that he finished physical therapy and that it didn't really help and wants to move forward with the MRI.

## 2025-01-06 ENCOUNTER — PATIENT MESSAGE (OUTPATIENT)
Dept: FAMILY MEDICINE | Facility: CLINIC | Age: 25
End: 2025-01-06
Payer: COMMERCIAL

## 2025-01-07 DIAGNOSIS — M25.562 CHRONIC PAIN OF LEFT KNEE: Primary | ICD-10-CM

## 2025-01-07 DIAGNOSIS — G89.29 CHRONIC PAIN OF LEFT KNEE: Primary | ICD-10-CM

## 2025-01-13 DIAGNOSIS — M25.562 CHRONIC PAIN OF LEFT KNEE: ICD-10-CM

## 2025-01-13 DIAGNOSIS — M25.552 LEFT HIP PAIN: Primary | ICD-10-CM

## 2025-01-13 DIAGNOSIS — G89.29 CHRONIC PAIN OF LEFT KNEE: ICD-10-CM

## 2025-01-29 ENCOUNTER — PATIENT MESSAGE (OUTPATIENT)
Dept: FAMILY MEDICINE | Facility: CLINIC | Age: 25
End: 2025-01-29
Payer: COMMERCIAL

## 2025-02-04 ENCOUNTER — OFFICE VISIT (OUTPATIENT)
Dept: FAMILY MEDICINE | Facility: CLINIC | Age: 25
End: 2025-02-04
Payer: COMMERCIAL

## 2025-02-04 VITALS
TEMPERATURE: 98 F | HEIGHT: 71 IN | BODY MASS INDEX: 42.44 KG/M2 | WEIGHT: 303.19 LBS | HEART RATE: 115 BPM | SYSTOLIC BLOOD PRESSURE: 110 MMHG | DIASTOLIC BLOOD PRESSURE: 75 MMHG | RESPIRATION RATE: 19 BRPM | OXYGEN SATURATION: 96 %

## 2025-02-04 DIAGNOSIS — J01.00 ACUTE NON-RECURRENT MAXILLARY SINUSITIS: Primary | ICD-10-CM

## 2025-02-04 DIAGNOSIS — E78.2 ELEVATED CHOLESTEROL WITH ELEVATED TRIGLYCERIDES: ICD-10-CM

## 2025-02-04 DIAGNOSIS — I10 PRIMARY HYPERTENSION: Chronic | ICD-10-CM

## 2025-02-04 DIAGNOSIS — K76.9 LESION OF LIVER: ICD-10-CM

## 2025-02-04 PROCEDURE — 3008F BODY MASS INDEX DOCD: CPT | Mod: CPTII,,, | Performed by: NURSE PRACTITIONER

## 2025-02-04 PROCEDURE — 4010F ACE/ARB THERAPY RXD/TAKEN: CPT | Mod: CPTII,,, | Performed by: NURSE PRACTITIONER

## 2025-02-04 PROCEDURE — 1160F RVW MEDS BY RX/DR IN RCRD: CPT | Mod: CPTII,,, | Performed by: NURSE PRACTITIONER

## 2025-02-04 PROCEDURE — 99214 OFFICE O/P EST MOD 30 MIN: CPT | Mod: 25,,, | Performed by: NURSE PRACTITIONER

## 2025-02-04 PROCEDURE — 96372 THER/PROPH/DIAG INJ SC/IM: CPT | Mod: ,,, | Performed by: NURSE PRACTITIONER

## 2025-02-04 PROCEDURE — 3074F SYST BP LT 130 MM HG: CPT | Mod: CPTII,,, | Performed by: NURSE PRACTITIONER

## 2025-02-04 PROCEDURE — 3078F DIAST BP <80 MM HG: CPT | Mod: CPTII,,, | Performed by: NURSE PRACTITIONER

## 2025-02-04 PROCEDURE — 1159F MED LIST DOCD IN RCRD: CPT | Mod: CPTII,,, | Performed by: NURSE PRACTITIONER

## 2025-02-04 RX ORDER — CEFUROXIME AXETIL 500 MG/1
500 TABLET ORAL EVERY 12 HOURS
Qty: 14 TABLET | Refills: 0 | Status: SHIPPED | OUTPATIENT
Start: 2025-02-04

## 2025-02-04 RX ORDER — CEFTRIAXONE 1 G/1
1 INJECTION, POWDER, FOR SOLUTION INTRAMUSCULAR; INTRAVENOUS
Status: COMPLETED | OUTPATIENT
Start: 2025-02-04 | End: 2025-02-04

## 2025-02-04 RX ORDER — DEXAMETHASONE SODIUM PHOSPHATE 4 MG/ML
4 INJECTION, SOLUTION INTRA-ARTICULAR; INTRALESIONAL; INTRAMUSCULAR; INTRAVENOUS; SOFT TISSUE
Status: COMPLETED | OUTPATIENT
Start: 2025-02-04 | End: 2025-02-04

## 2025-02-04 RX ORDER — LISINOPRIL AND HYDROCHLOROTHIAZIDE 12.5; 2 MG/1; MG/1
1 TABLET ORAL DAILY
Qty: 90 TABLET | Refills: 3 | Status: SHIPPED | OUTPATIENT
Start: 2025-02-04

## 2025-02-04 RX ADMIN — DEXAMETHASONE SODIUM PHOSPHATE 4 MG: 4 INJECTION, SOLUTION INTRA-ARTICULAR; INTRALESIONAL; INTRAMUSCULAR; INTRAVENOUS; SOFT TISSUE at 02:02

## 2025-02-04 RX ADMIN — CEFTRIAXONE 1 G: 1 INJECTION, POWDER, FOR SOLUTION INTRAMUSCULAR; INTRAVENOUS at 02:02

## 2025-02-14 PROBLEM — J01.00 ACUTE NON-RECURRENT MAXILLARY SINUSITIS: Status: ACTIVE | Noted: 2025-02-14

## 2025-02-14 NOTE — PROGRESS NOTES
SHIVAM Cordero   [facility]  [address]  [place] [address]     PATIENT NAME: Lester Bradley  : 2000  DATE: 25  MRN: [id number]      Billing Provider: SHIVAM Cordero  Level of Service:   Patient PCP Information       Provider PCP Type    SHIVAM Cordero General            Reason for Visit / Chief Complaint: Hyperlipidemia (Pt is here for his 6 month FU and he said that he needs refills. He also said he's having some sinus drainage stuff going on and wants a shot for it. He said he gets it every year)   Health Maintenance Due   Topic Date Due    Hepatitis C Screening  Never done    HPV Vaccines (1 - Male 3-dose series) Never done    TETANUS VACCINE  Never done    Pneumococcal Vaccines (Age 0-49) (1 of 2 - PCV) Never done    COVID-19 Vaccine ( season) Never done          Update PCP  Update Chief Complaint         History of Present Illness / Problem Focused Workflow     History of Present Illness    CHIEF COMPLAINT:  Patient presents today for sinus symptoms.    UPPER RESPIRATORY SYMPTOMS:  He reports sinus symptoms including stuffy pressure, post-nasal drainage, and productive cough with yellow-green sputum.    MUSCULOSKELETAL:  He has hip arthritis with ongoing pain that had minimal improvement with previous physical therapy. He denies taking pain medication despite having pain.    ALLERGIES:  He has documented allergies to Z-Danny and penicillin.    MEDICAL HISTORY:  History significant for elevated liver enzymes.      ROS:  General: -fever, -chills, -fatigue, -weight gain, -weight loss  Eyes: -vision changes, -redness, -discharge  ENT: -ear pain, +nasal congestion, -sore throat  Cardiovascular: -chest pain, -palpitations, -lower extremity edema  Respiratory: +cough, -shortness of breath  Gastrointestinal: -abdominal pain, -nausea, -vomiting, -diarrhea, -constipation, -blood in stool  Genitourinary: -dysuria, -hematuria, -frequency  Musculoskeletal: +joint pain,  "-muscle pain  Skin: -rash, -lesion  Neurological: -headache, -dizziness, -numbness, -tingling  Psychiatric: -anxiety, -depression, -sleep difficulty          No results found for: "HGBA1C"     CMP  Sodium   Date Value Ref Range Status   08/16/2024 137 136 - 145 mmol/L Final     Potassium   Date Value Ref Range Status   08/16/2024 4.3 3.5 - 5.1 mmol/L Final     Chloride   Date Value Ref Range Status   08/16/2024 102 98 - 107 mmol/L Final     CO2   Date Value Ref Range Status   08/16/2024 30 21 - 32 mmol/L Final     Glucose   Date Value Ref Range Status   08/16/2024 105 74 - 106 mg/dL Final     BUN   Date Value Ref Range Status   08/16/2024 15 7 - 18 mg/dL Final     Creatinine   Date Value Ref Range Status   08/16/2024 0.92 0.70 - 1.30 mg/dL Final     Calcium   Date Value Ref Range Status   08/16/2024 10.0 8.5 - 10.1 mg/dL Final     Total Protein   Date Value Ref Range Status   08/16/2024 7.7 6.4 - 8.2 g/dL Final     Albumin   Date Value Ref Range Status   08/16/2024 4.8 3.5 - 5.0 g/dL Final     Bilirubin, Total   Date Value Ref Range Status   08/16/2024 0.9 >0.0 - 1.2 mg/dL Final     Alk Phos   Date Value Ref Range Status   08/16/2024 54 45 - 115 U/L Final     AST   Date Value Ref Range Status   08/16/2024 73 (H) 15 - 37 U/L Final     ALT   Date Value Ref Range Status   08/16/2024 199 (H) 16 - 61 U/L Final     Anion Gap   Date Value Ref Range Status   08/16/2024 9 7 - 16 mmol/L Final     eGFR   Date Value Ref Range Status   08/16/2024 121 >=60 mL/min/1.73m2 Final        Lab Results   Component Value Date    WBC 5.62 08/16/2024    RBC 5.96 08/16/2024    HGB 16.7 08/16/2024    HCT 50.0 08/16/2024    MCV 83.9 08/16/2024    MCH 28.0 08/16/2024    MCHC 33.4 08/16/2024    RDW 12.6 08/16/2024     08/16/2024    MPV 9.5 08/16/2024    LYMPH 46.1 (H) 08/16/2024    LYMPH 2.59 08/16/2024    MONO 10.7 (H) 08/16/2024    EOS 0.10 08/16/2024    BASO 0.04 08/16/2024    EOSINOPHIL 1.8 08/16/2024    BASOPHIL 0.7 08/16/2024    "     Lab Results   Component Value Date    CHOL 211 (H) 08/16/2024     Lab Results   Component Value Date    HDL 36 (L) 08/16/2024     Lab Results   Component Value Date    LDLCALC 117 08/16/2024     Lab Results   Component Value Date    TRIG 289 (H) 08/16/2024     Lab Results   Component Value Date    CHOLHDL 5.9 08/16/2024        Wt Readings from Last 3 Encounters:   02/04/25 1336 (!) 137.5 kg (303 lb 3.2 oz)   11/19/24 1319 135.6 kg (299 lb)   08/16/24 0932 133.4 kg (294 lb 3.2 oz)        BP Readings from Last 3 Encounters:   02/04/25 110/75   11/19/24 127/78   08/16/24 130/85        Review of Systems     Review of Systems       Medical / Social / Family History     Past Medical History:   Diagnosis Date    Allergy     Anxiety     Depression     Hypertension        Past Surgical History:   Procedure Laterality Date    TONSILLECTOMY         Social History    reports that he has never smoked. He has never been exposed to tobacco smoke. He has never used smokeless tobacco. He reports current alcohol use. He reports that he does not use drugs.    Family History  's family history includes Arthritis in his mother; Cancer in his father, paternal grandmother, and paternal uncle; Diabetes in his mother; Hypertension in his father and mother.    Medications and Allergies     Medications  Outpatient Medications Marked as Taking for the 2/4/25 encounter (Office Visit) with Sade Rojo FNP   Medication Sig Dispense Refill    ADDERALL XR 20 mg 24 hr capsule       sertraline (ZOLOFT) 50 MG tablet Take 50 mg by mouth.      ubrogepant (UBRELVY) 100 mg tablet Take 1 tablet (100 mg total) by mouth as needed for Migraine. If symptoms persist or return, may repeat dose after 2 hours. Maximum: 200 mg per 24 hours 30 tablet 1    [DISCONTINUED] lisinopriL-hydrochlorothiazide (PRINZIDE,ZESTORETIC) 20-12.5 mg per tablet Take 1 tablet by mouth.         Allergies  Review of patient's allergies indicates:   Allergen Reactions     "Penicillins Hives and Rash     As a child but just recently too augmentin & did not have a problem.    Povidone-iodine Rash    Azithromycin        Physical Examination     Vitals:    02/04/25 1336   BP: 110/75   BP Location: Left arm   Patient Position: Sitting   Pulse: (!) 115   Resp: 19   Temp: 98.2 °F (36.8 °C)   TempSrc: Oral   SpO2: 96%   Weight: (!) 137.5 kg (303 lb 3.2 oz)   Height: 5' 11" (1.803 m)      Physical Exam  Constitutional:       Appearance: Normal appearance. He is obese.   HENT:      Head: Normocephalic.      Right Ear: Hearing, ear canal and external ear normal. A middle ear effusion is present.      Left Ear: Hearing, ear canal and external ear normal. A middle ear effusion is present.      Nose: Congestion present.      Right Turbinates: Swollen.      Left Turbinates: Swollen.      Right Sinus: Maxillary sinus tenderness present.      Left Sinus: Maxillary sinus tenderness present.      Mouth/Throat:      Mouth: Mucous membranes are moist.      Pharynx: Postnasal drip present.   Eyes:      Extraocular Movements: Extraocular movements intact.   Cardiovascular:      Rate and Rhythm: Normal rate and regular rhythm.      Pulses: Normal pulses.      Heart sounds: Normal heart sounds.   Pulmonary:      Effort: Pulmonary effort is normal.      Breath sounds: Normal breath sounds.   Skin:     General: Skin is warm and dry.      Capillary Refill: Capillary refill takes less than 2 seconds.   Neurological:      General: No focal deficit present.      Mental Status: He is alert and oriented to person, place, and time.   Psychiatric:         Mood and Affect: Mood normal.         Behavior: Behavior normal.          Assessment and Plan (including Health Maintenance)      Problem List  Smart Sets  Document Outside HM   :    Plan:     There are no Patient Instructions on file for this visit.       Health Maintenance Due   Topic Date Due    Hepatitis C Screening  Never done    HPV Vaccines (1 - Male 3-dose " series) Never done    TETANUS VACCINE  Never done    Pneumococcal Vaccines (Age 0-49) (1 of 2 - PCV) Never done    COVID-19 Vaccine (1 - 2024-25 season) Never done       Problem List Items Addressed This Visit       Hypertension (Chronic)    Relevant Medications    lisinopriL-hydrochlorothiazide (PRINZIDE,ZESTORETIC) 20-12.5 mg per tablet    Elevated cholesterol with elevated triglycerides - Primary    Relevant Orders    CBC Auto Differential    Comprehensive Metabolic Panel    Lipid Panel    Lesion of liver    Relevant Orders    CBC Auto Differential    Comprehensive Metabolic Panel     Other Visit Diagnoses       Acute non-recurrent maxillary sinusitis        Relevant Medications    cefTRIAXone injection 1 g (Completed)    dexAMETHasone injection 4 mg (Completed)    cefUROXime (CEFTIN) 500 MG tablet          Assessment & Plan    IMPRESSION:  - Assessed hip arthritis symptoms and knee condition  - Evaluated need for repeat labs, including cholesterol and liver enzymes  - Reviewed history of elevated liver enzymes, noting normal scan results  - Assessed current symptoms, including sinus congestion and cough with yellow/green sputum  - Conducted physical exam, noting fluid retention in ears and signs of throat drainage  - Determined need for antibiotic treatment based on symptoms and medication allergies    HTN  - Blood pressure is controlled. Current medical regimen is effective;   Will adjust according to results and monitor.     HIP PAIN / ARTHRITIS:  - Explained the benefits of NSAIDs for both pain relief and inflammation reduction compared to acetaminophen.  - Recommend anti-inflammatory medication (NSAIDs) for pain and inflammation management.  - Noted that the patient previously attempted physical therapy with limited efficacy.  - Patient reports ongoing hip pain.  - Previous imaging revealed arthritis in the hip.    HYPERLIPIDEMIA:  - Discussed the importance of fasting before lipid panel.  - Ordered repeat  labs, including fasting lipid panel and liver function tests.  - Instructed the patient to return for fasting labs on any morning without scheduling an appointment.    ELEVATED LIVER ENZYMES:  - Previous lab results showed elevated liver enzymes.  - Discussed possible causes of elevated liver enzymes, including hereditary and lifestyle factors.    SINUS INFECTION / UPPER RESPIRATORY INFECTION:  - Prescribed oral antibiotics   - Ordered a steroid injection (likely dexamethasone) to be administered in the office for sinus symptoms.  - Patient reports sinus symptoms.  - Performed a physical exam, noting fluid in ears and drainage in throat, but no wheezing.  - Planned to prescribe antibiotics, considering options such as doxycycline or cefuroxime.  - Patient reports coughing up yellow-green sputum.  - Patient reports coughing.  - Considered alternative antibiotics due to the patient's documented allergies to azithromycin and penicillin.    FOLLOW UP:  - Follow up in 6 months.        Elevated cholesterol with elevated triglycerides  -     CBC Auto Differential; Future; Expected date: 02/04/2025  -     Comprehensive Metabolic Panel; Future; Expected date: 02/04/2025  -     Lipid Panel; Future; Expected date: 02/04/2025    Lesion of liver  -     CBC Auto Differential; Future; Expected date: 02/04/2025  -     Comprehensive Metabolic Panel; Future; Expected date: 02/04/2025    Primary hypertension  -     lisinopriL-hydrochlorothiazide (PRINZIDE,ZESTORETIC) 20-12.5 mg per tablet; Take 1 tablet by mouth once daily.  Dispense: 90 tablet; Refill: 3    Acute non-recurrent maxillary sinusitis  -     cefTRIAXone injection 1 g  -     dexAMETHasone injection 4 mg  -     cefUROXime (CEFTIN) 500 MG tablet; Take 1 tablet (500 mg total) by mouth every 12 (twelve) hours.  Dispense: 14 tablet; Refill: 0       Health Maintenance Topics with due status: Not Due       Topic Last Completion Date    RSV Vaccine (Age 60+ and Pregnant patients)  Not Due         Future Appointments   Date Time Provider Department Center   8/4/2025 11:00 AM [facility] [place]        Follow up in about 6 months (around 8/4/2025), or if symptoms worsen or fail to improve.    Health Maintenance Due   Topic Date Due    Hepatitis C Screening  Never done    HPV Vaccines (1 - Male 3-dose series) Never done    TETANUS VACCINE  Never done    Pneumococcal Vaccines (Age 0-49) (1 of 2 - PCV) Never done    COVID-19 Vaccine (1 - 2024-25 season) Never done        Signature:  SHIVAM Cordero    Date of encounter: 2/4/25  This note was generated with the assistance of ambient listening technology. Verbal consent was obtained by the patient and accompanying visitor(s) for the recording of patient appointment to facilitate this note. I attest to having reviewed and edited the generated note for accuracy, though some syntax or spelling errors may persist. Please contact the author of this note for any clarification.